# Patient Record
Sex: FEMALE | Race: WHITE | NOT HISPANIC OR LATINO | URBAN - METROPOLITAN AREA
[De-identification: names, ages, dates, MRNs, and addresses within clinical notes are randomized per-mention and may not be internally consistent; named-entity substitution may affect disease eponyms.]

---
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
## 2023-11-27 ENCOUNTER — OFFICE VISIT (OUTPATIENT)
Dept: URGENT CARE | Facility: CLINIC | Age: 23
End: 2023-11-27
Payer: COMMERCIAL

## 2023-11-27 VITALS
DIASTOLIC BLOOD PRESSURE: 95 MMHG | TEMPERATURE: 98 F | SYSTOLIC BLOOD PRESSURE: 136 MMHG | HEART RATE: 107 BPM | BODY MASS INDEX: 20.25 KG/M2 | OXYGEN SATURATION: 100 % | HEIGHT: 66 IN | RESPIRATION RATE: 20 BRPM | WEIGHT: 126 LBS

## 2023-11-27 DIAGNOSIS — J32.9 SINUSITIS, UNSPECIFIED CHRONICITY, UNSPECIFIED LOCATION: Primary | ICD-10-CM

## 2023-11-27 PROCEDURE — 96372 PR INJECTION,THERAP/PROPH/DIAG2ST, IM OR SUBCUT: ICD-10-PCS | Mod: ,,,

## 2023-11-27 PROCEDURE — 99203 PR OFFICE/OUTPT VISIT, NEW, LEVL III, 30-44 MIN: ICD-10-PCS | Mod: 25,,,

## 2023-11-27 PROCEDURE — 96372 THER/PROPH/DIAG INJ SC/IM: CPT | Mod: ,,,

## 2023-11-27 PROCEDURE — 99203 OFFICE O/P NEW LOW 30 MIN: CPT | Mod: 25,,,

## 2023-11-27 RX ORDER — BUSPIRONE HYDROCHLORIDE 10 MG/1
10 TABLET ORAL 3 TIMES DAILY
COMMUNITY

## 2023-11-27 RX ORDER — IPRATROPIUM BROMIDE 42 UG/1
2 SPRAY, METERED NASAL 3 TIMES DAILY
COMMUNITY
Start: 2023-11-21

## 2023-11-27 RX ORDER — BENZONATATE 200 MG/1
CAPSULE ORAL
COMMUNITY
Start: 2023-11-21

## 2023-11-27 RX ORDER — DEXAMETHASONE SODIUM PHOSPHATE 100 MG/10ML
10 INJECTION INTRAMUSCULAR; INTRAVENOUS ONCE
Status: COMPLETED | OUTPATIENT
Start: 2023-11-27 | End: 2023-11-27

## 2023-11-27 RX ORDER — AMOXICILLIN AND CLAVULANATE POTASSIUM 875; 125 MG/1; MG/1
1 TABLET, FILM COATED ORAL EVERY 12 HOURS
Qty: 14 TABLET | Refills: 0 | Status: SHIPPED | OUTPATIENT
Start: 2023-11-27 | End: 2023-12-04

## 2023-11-27 RX ADMIN — DEXAMETHASONE SODIUM PHOSPHATE 10 MG: 100 INJECTION INTRAMUSCULAR; INTRAVENOUS at 11:11

## 2023-11-27 NOTE — PROGRESS NOTES
"Subjective:      Patient ID: Xin Fuchs is a 23 y.o. female.    Vitals:  height is 5' 6" (1.676 m) and weight is 57.2 kg (126 lb). Her oral temperature is 98.4 °F (36.9 °C). Her blood pressure is 136/95 (abnormal) and her pulse is 107. Her respiration is 20 and oxygen saturation is 100%.     Chief Complaint: Sinus Problem (Sinus congestion, headache, sneezing, coughing x 12 days. Pt was diagnosed with RSV on 11/21/23. Pt declines any poct testing.)    Patient is a 23-year-old female that presents with a 12 day history of congestion which she states now is producing brown mucus that has a bad smell to it, sinus pressure headache and pressure around eyes and jaw, sneezing and coughing.  States she was diagnosed with RSV on 11/21/2023, was told to take over-the-counter medications but is not getting any better.     Patient denies any SOB, CP, rash, n/v/d, or neck stiffness.      Sinus Problem  Associated symptoms include congestion, coughing and sinus pressure.       HENT:  Positive for congestion and sinus pressure.    Respiratory:  Positive for cough.       Objective:     Physical Exam   Constitutional: She is oriented to person, place, and time.  Non-toxic appearance. She does not appear ill. No distress.   HENT:   Ears:   Right Ear: Tympanic membrane, external ear and ear canal normal.   Left Ear: Tympanic membrane, external ear and ear canal normal.      Comments: Clear fluid in bilateral ears  Nose: Congestion present. Right sinus exhibits maxillary sinus tenderness and frontal sinus tenderness. Left sinus exhibits maxillary sinus tenderness and frontal sinus tenderness.   Mouth/Throat: Mucous membranes are moist. No posterior oropharyngeal erythema. No tonsillar exudate. Oropharynx is clear.      Comments: Clear postnasal drip  Eyes: Conjunctivae are normal.   Cardiovascular: Normal rate and normal pulses.   Pulmonary/Chest: Effort normal and breath sounds normal.   Abdominal: Normal appearance and bowel " sounds are normal. Soft. There is no abdominal tenderness.   Musculoskeletal: Normal range of motion.         General: Normal range of motion.   Neurological: She is alert and oriented to person, place, and time.   Skin: Skin is warm and dry. Capillary refill takes less than 2 seconds.   Psychiatric: Her behavior is normal. Mood normal.       Assessment:     1. Sinusitis, unspecified chronicity, unspecified location        Plan:       Sinusitis, unspecified chronicity, unspecified location  -     amoxicillin-clavulanate 875-125mg (AUGMENTIN) 875-125 mg per tablet; Take 1 tablet by mouth every 12 (twelve) hours. for 7 days  Dispense: 14 tablet; Refill: 0  -     dexAMETHasone injection 10 mg    Take OTC cough/cold/congestion medication such as Dayquil/Nyquil.  May also take antihistamine such as Claritin/Zyrtec/Allegra.  Cepacol sore throat lozenges if needed.     Drink plenty of fluids.  Rest.      Take antibiotic until completely gone. Take with food to avoid upset stomach.

## 2023-12-16 ENCOUNTER — OFFICE VISIT (OUTPATIENT)
Dept: URGENT CARE | Facility: CLINIC | Age: 23
End: 2023-12-16
Payer: COMMERCIAL

## 2023-12-16 VITALS
DIASTOLIC BLOOD PRESSURE: 79 MMHG | TEMPERATURE: 98 F | BODY MASS INDEX: 20.25 KG/M2 | RESPIRATION RATE: 20 BRPM | SYSTOLIC BLOOD PRESSURE: 110 MMHG | HEART RATE: 122 BPM | WEIGHT: 126 LBS | HEIGHT: 66 IN | OXYGEN SATURATION: 97 %

## 2023-12-16 DIAGNOSIS — R11.2 NAUSEA AND VOMITING, UNSPECIFIED VOMITING TYPE: ICD-10-CM

## 2023-12-16 DIAGNOSIS — R05.9 COUGH, UNSPECIFIED TYPE: ICD-10-CM

## 2023-12-16 DIAGNOSIS — J10.1 INFLUENZA A: Primary | ICD-10-CM

## 2023-12-16 LAB
CTP QC/QA: YES
POC MOLECULAR INFLUENZA A AGN: POSITIVE
POC MOLECULAR INFLUENZA B AGN: NEGATIVE

## 2023-12-16 PROCEDURE — 87502 POCT INFLUENZA A/B MOLECULAR: ICD-10-PCS | Mod: QW,,,

## 2023-12-16 PROCEDURE — 87502 INFLUENZA DNA AMP PROBE: CPT | Mod: QW,,,

## 2023-12-16 PROCEDURE — 99213 OFFICE O/P EST LOW 20 MIN: CPT | Mod: ,,,

## 2023-12-16 PROCEDURE — 99213 PR OFFICE/OUTPT VISIT, EST, LEVL III, 20-29 MIN: ICD-10-PCS | Mod: ,,,

## 2023-12-16 RX ORDER — ONDANSETRON 4 MG/1
4 TABLET, ORALLY DISINTEGRATING ORAL EVERY 8 HOURS PRN
Qty: 15 TABLET | Refills: 0 | Status: SHIPPED | OUTPATIENT
Start: 2023-12-16

## 2023-12-16 NOTE — PATIENT INSTRUCTIONS
Flu positive, being you have had symptoms for over 72 hours it was not recommended to check Tamiflu.     Drink lots of fluids. Clear liquids today, then slowly resume your usual diet starting with crackers, toast, soup when your appetite returns. Bananas, rice, applesauce, and toast to firm up stool when your appetite returns.     OTC Pepto Bismol or Imodium for diarrhea.      Zofran as needed for nausea.    Go to ER/Return for any high fever, worsening pains, or vomiting unrelieved by Zofran.

## 2023-12-16 NOTE — PROGRESS NOTES
"Subjective:      Patient ID: Xin Fuchs is a 23 y.o. female.    Vitals:  height is 5' 6" (1.676 m) and weight is 57.2 kg (126 lb). Her oral temperature is 98.4 °F (36.9 °C). Her blood pressure is 110/79 and her pulse is 122 (abnormal). Her respiration is 20 and oxygen saturation is 97%.     Chief Complaint: Cough (Cough, body aches, diarrhea, vomiting, fever of 101 F x 3 days.)    Patient is a 23-year-old female that presents complaining of cough, fever, vomiting and diarrhea x3 days.  States she has been taking care of her daughter who was positive for flu. Patient denies any SOB, CP, rash, or neck stiffness.              Constitution: Positive for fever.   Respiratory:  Positive for cough.    Gastrointestinal:  Positive for nausea, vomiting and diarrhea.      Objective:     Physical Exam   Constitutional: She is oriented to person, place, and time.  Non-toxic appearance. She does not appear ill.   HENT:   Ears:   Right Ear: Tympanic membrane, external ear and ear canal normal.   Left Ear: Tympanic membrane, external ear and ear canal normal.   Nose: Congestion present.   Mouth/Throat: Mucous membranes are moist. No posterior oropharyngeal erythema. Oropharynx is clear.   Eyes: Conjunctivae are normal.   Cardiovascular: Regular rhythm and normal pulses. Tachycardia present.   Pulmonary/Chest: Effort normal and breath sounds normal.   Abdominal: Normal appearance and bowel sounds are normal. Soft. There is no abdominal tenderness.   Musculoskeletal: Normal range of motion.         General: Normal range of motion.   Neurological: She is alert and oriented to person, place, and time.   Skin: Skin is warm, dry, pale and no rash. Capillary refill takes less than 2 seconds.   Psychiatric: Her behavior is normal. Mood normal.       Assessment:     1. Influenza A    2. Cough, unspecified type    3. Nausea and vomiting, unspecified vomiting type           Previous History      Review of patient's allergies " "indicates:  No Known Allergies    Past Medical History:   Diagnosis Date    Anxiety     Nonrheumatic mitral (valve) insufficiency      Current Outpatient Medications   Medication Instructions    benzonatate (TESSALON) 200 MG capsule SMARTSI Capsule(s) By Mouth 2-3 Times Daily PRN    busPIRone (BUSPAR) 10 mg, Oral, 3 times daily    ipratropium (ATROVENT) 42 mcg (0.06 %) nasal spray 2 sprays, Each Nostril, 3 times daily    ondansetron (ZOFRAN-ODT) 4 mg, Oral, Every 8 hours PRN     Past Surgical History:   Procedure Laterality Date    ADENOIDECTOMY      kidney stent      x2    TONSILLECTOMY       Family History   Problem Relation Age of Onset    Hypertension Mother     Diabetes Mother     Hypertension Father     Heart disease Father        Social History     Tobacco Use    Smoking status: Never    Smokeless tobacco: Never   Substance Use Topics    Alcohol use: Yes     Comment: not even weekly    Drug use: Never        Physical Exam      Vital Signs Reviewed   /79 (BP Location: Left arm)   Pulse (!) 122   Temp 98.4 °F (36.9 °C) (Oral)   Resp 20   Ht 5' 6" (1.676 m)   Wt 57.2 kg (126 lb)   LMP 2023   SpO2 97%   BMI 20.34 kg/m²        Procedures    Procedures     Labs     Results for orders placed or performed in visit on 23   POCT Influenza A/B MOLECULAR   Result Value Ref Range    POC Molecular Influenza A Ag Positive (A) Negative, Not Reported    POC Molecular Influenza B Ag Negative Negative, Not Reported     Acceptable Yes       Plan:     Did offer patient fluids, she stated she did not think it as needed at this time.    Influenza A    Cough, unspecified type  -     POCT Influenza A/B MOLECULAR    Nausea and vomiting, unspecified vomiting type  -     ondansetron (ZOFRAN-ODT) 4 MG TbDL; Take 1 tablet (4 mg total) by mouth every 8 (eight) hours as needed (nausea).  Dispense: 15 tablet; Refill: 0      Flu positive, being you have had symptoms for over 72 hours it was not " recommended to check Tamiflu.     Drink lots of fluids. Clear liquids today, then slowly resume your usual diet starting with crackers, toast, soup when your appetite returns. Bananas, rice, applesauce, and toast to firm up stool when your appetite returns.     OTC Pepto Bismol or Imodium for diarrhea.      Zofran as needed for nausea.    Go to ER/Return for any high fever, worsening pains, or vomiting unrelieved by Zofran.

## 2024-06-22 ENCOUNTER — OFFICE VISIT (OUTPATIENT)
Dept: URGENT CARE | Facility: CLINIC | Age: 24
End: 2024-06-22
Payer: OTHER GOVERNMENT

## 2024-06-22 VITALS
SYSTOLIC BLOOD PRESSURE: 121 MMHG | HEIGHT: 66 IN | DIASTOLIC BLOOD PRESSURE: 80 MMHG | WEIGHT: 126 LBS | OXYGEN SATURATION: 100 % | BODY MASS INDEX: 20.25 KG/M2 | RESPIRATION RATE: 20 BRPM | HEART RATE: 106 BPM | TEMPERATURE: 98 F

## 2024-06-22 DIAGNOSIS — J02.9 SORE THROAT: Primary | ICD-10-CM

## 2024-06-22 DIAGNOSIS — J02.9 PHARYNGITIS, UNSPECIFIED ETIOLOGY: ICD-10-CM

## 2024-06-22 LAB
CTP QC/QA: YES
MOLECULAR STREP A: NEGATIVE

## 2024-06-22 PROCEDURE — 87651 STREP A DNA AMP PROBE: CPT | Mod: QW,,, | Performed by: NURSE PRACTITIONER

## 2024-06-22 PROCEDURE — 99213 OFFICE O/P EST LOW 20 MIN: CPT | Mod: 25,,, | Performed by: NURSE PRACTITIONER

## 2024-06-22 PROCEDURE — 96372 THER/PROPH/DIAG INJ SC/IM: CPT | Mod: ,,, | Performed by: NURSE PRACTITIONER

## 2024-06-22 RX ORDER — AMOXICILLIN 500 MG/1
500 CAPSULE ORAL 3 TIMES DAILY
Qty: 30 CAPSULE | Refills: 0 | Status: SHIPPED | OUTPATIENT
Start: 2024-06-22 | End: 2024-07-02

## 2024-06-22 RX ORDER — BETAMETHASONE SODIUM PHOSPHATE AND BETAMETHASONE ACETATE 3; 3 MG/ML; MG/ML
6 INJECTION, SUSPENSION INTRA-ARTICULAR; INTRALESIONAL; INTRAMUSCULAR; SOFT TISSUE
Status: COMPLETED | OUTPATIENT
Start: 2024-06-22 | End: 2024-06-22

## 2024-06-22 RX ADMIN — BETAMETHASONE SODIUM PHOSPHATE AND BETAMETHASONE ACETATE 6 MG: 3; 3 INJECTION, SUSPENSION INTRA-ARTICULAR; INTRALESIONAL; INTRAMUSCULAR; SOFT TISSUE at 12:06

## 2024-06-22 NOTE — PATIENT INSTRUCTIONS
Increase oral fluids  Warm salt water gargles as instructed  OTC Chloraseptic spray as directed  Ibuprofen or Tylenol OTC for pain as directed  Take prescription medication as directed.  Amoxicillin  Follow up PCP or return here for concerns

## 2024-06-22 NOTE — PROGRESS NOTES
"Subjective:      Patient ID: Xin Fuchs is a 24 y.o. female.    Vitals:  height is 5' 6" (1.676 m) and weight is 57.2 kg (126 lb). Her temperature is 98.2 °F (36.8 °C). Her blood pressure is 121/80 and her pulse is 106. Her respiration is 20 and oxygen saturation is 100%.     Chief Complaint: Sore Throat     Patient is a 24 y.o. female who presents to urgent care with complaints of sore throat, sinus drip, headache x5 days. Pt symptoms worsened starting yesterday. Patient denies sinus congestion, stomach ache, body aches, chills, or fever.       HENT:  Positive for congestion, postnasal drip and sore throat.       Objective:     Physical Exam   Constitutional: She is oriented to person, place, and time. She appears well-developed. She is cooperative.  Non-toxic appearance. She does not appear ill. No distress.   HENT:   Head: Normocephalic and atraumatic.   Ears:   Right Ear: Hearing, tympanic membrane, external ear and ear canal normal.   Left Ear: Hearing, tympanic membrane, external ear and ear canal normal.   Nose: Nose normal. No mucosal edema, rhinorrhea or nasal deformity. No epistaxis. Right sinus exhibits no maxillary sinus tenderness and no frontal sinus tenderness. Left sinus exhibits no maxillary sinus tenderness and no frontal sinus tenderness.   Mouth/Throat: Uvula is midline and mucous membranes are normal. No trismus in the jaw. Normal dentition. No uvula swelling. Posterior oropharyngeal erythema present. No oropharyngeal exudate or posterior oropharyngeal edema.   Eyes: Conjunctivae and lids are normal. No scleral icterus.   Neck: Trachea normal and phonation normal. Neck supple. No edema present. No erythema present. No neck rigidity present.   Cardiovascular: Normal rate, regular rhythm, normal heart sounds and normal pulses.   Pulmonary/Chest: Effort normal and breath sounds normal. No respiratory distress. She has no decreased breath sounds. She has no rhonchi.   Abdominal: Normal " appearance.   Musculoskeletal: Normal range of motion.         General: No deformity. Normal range of motion.   Neurological: She is alert and oriented to person, place, and time. She exhibits normal muscle tone. Coordination normal.   Skin: Skin is warm, dry, intact, not diaphoretic and not pale.   Psychiatric: Her speech is normal and behavior is normal. Judgment and thought content normal.   Nursing note and vitals reviewed.      Assessment:     1. Sore throat    2. Pharyngitis, unspecified etiology      Office Visit on 06/22/2024   Component Date Value Ref Range Status    Molecular Strep A, POC 06/22/2024 Negative  Negative Final     Acceptable 06/22/2024 Yes   Final       Plan:   Increase oral fluids  Warm salt water gargles as instructed  OTC Chloraseptic spray as directed  Ibuprofen or Tylenol OTC for pain as directed  Take prescription medication as directed.  Amoxicillin  Follow up PCP or return here for concerns   Despite a negative strep test antibiotics will be prescribed due to clinic assessment and findings  Sore throat  -     POCT Strep A, Molecular    Pharyngitis, unspecified etiology  -     betamethasone acetate-betamethasone sodium phosphate injection 6 mg  -     amoxicillin (AMOXIL) 500 MG capsule; Take 1 capsule (500 mg total) by mouth 3 (three) times daily. for 10 days  Dispense: 30 capsule; Refill: 0